# Patient Record
Sex: MALE | Race: BLACK OR AFRICAN AMERICAN | URBAN - METROPOLITAN AREA
[De-identification: names, ages, dates, MRNs, and addresses within clinical notes are randomized per-mention and may not be internally consistent; named-entity substitution may affect disease eponyms.]

---

## 2023-10-31 ENCOUNTER — LAB OUTSIDE AN ENCOUNTER (OUTPATIENT)
Dept: URBAN - METROPOLITAN AREA CLINIC 74 | Facility: CLINIC | Age: 53
End: 2023-10-31

## 2023-10-31 ENCOUNTER — OFFICE VISIT (OUTPATIENT)
Dept: URBAN - METROPOLITAN AREA CLINIC 74 | Facility: CLINIC | Age: 53
End: 2023-10-31
Payer: COMMERCIAL

## 2023-10-31 VITALS
WEIGHT: 203.4 LBS | SYSTOLIC BLOOD PRESSURE: 168 MMHG | HEIGHT: 73 IN | TEMPERATURE: 97.2 F | HEART RATE: 92 BPM | DIASTOLIC BLOOD PRESSURE: 110 MMHG | BODY MASS INDEX: 26.96 KG/M2

## 2023-10-31 DIAGNOSIS — F10.10 ETOH ABUSE: ICD-10-CM

## 2023-10-31 DIAGNOSIS — R74.01 TRANSAMINITIS: ICD-10-CM

## 2023-10-31 PROBLEM — 15167005: Status: ACTIVE | Noted: 2023-10-31

## 2023-10-31 PROCEDURE — 99203 OFFICE O/P NEW LOW 30 MIN: CPT | Performed by: PHYSICIAN ASSISTANT

## 2023-10-31 RX ORDER — ALBUTEROL SULFATE 90 UG/1
AEROSOL, METERED RESPIRATORY (INHALATION)
Qty: 8 UNSPECIFIED | Status: ACTIVE | COMMUNITY

## 2023-10-31 RX ORDER — AZITHROMYCIN DIHYDRATE 250 MG/1
TABLET ORAL
Qty: 6 TABLET | Status: ACTIVE | COMMUNITY

## 2023-10-31 NOTE — HPI-TODAY'S VISIT:
The patient is 53-year-old male with past medical history as noted below is presenting to our clinic today with elevated liver enzymes. He admitted that he drinks ETOH  and smokes Tobacco daily. No GI issues today. Last Colonoscopy in 2019.    -- The patient denies dyspepsia, dysphagia, odynophagia, hemoptysis, hematemesis, nausea, vomiting, regurgitation, melena, constipation, diarrhea, abdominal pain, hematochezia, fever, chills, chest pain, SOB, or any other GI complaints today.  -- The patient admits to ETOH 2-3 beers and 2 shots of Whiskey daily. He smokes Tobacco daily.  He denies Illicit drug use.  -- The patient is up to date with Flu and COVID vaccine.  -- Denies NSAID's.   Procedure: -- Colonoscopy on 12/05/2019 by Dr. Muñoz unremarkable colonic mucosa.  No specimen collected.  Repeat colonoscopy for surveillance in 10 years.

## 2023-11-13 LAB
% SATURATION: 32
A/G RATIO: 1.4
ABSOLUTE BASOPHILS: 48
ABSOLUTE EOSINOPHILS: 174
ABSOLUTE LYMPHOCYTES: 1806
ABSOLUTE MONOCYTES: 888
ABSOLUTE NEUTROPHILS: 3084
ACTIN (SMOOTH MUSCLE) ANTIBODY (IGG): 24
ALBUMIN: 4.3
ALKALINE PHOSPHATASE: 50
ALKALINE PHOSPHATASE: 50
ALPHA-1-ANTITRYPSIN QN: 97
ALT (SGPT): 56
AMYLASE: 57
ANA SCREEN, IFA: POSITIVE
AST (SGOT): 61
BASOPHILS: 0.8
BILIRUBIN, TOTAL: 2.2
BONE ISOENZYMES: 48
BUN/CREATININE RATIO: (no result)
BUN: 20
CALCIUM: 9.7
CARBON DIOXIDE, TOTAL: 24
CERULOPLASMIN: 25
CHLORIDE: 105
CREATININE: 1.16
EGFR: 74
EOSINOPHILS: 2.9
FERRITIN: 273
GGT: 170
GLOBULIN, TOTAL: 3.1
GLUCOSE: 88
HBSAG SCREEN: (no result)
HEMATOCRIT: 44
HEMOGLOBIN: 15.3
HEP A AB, IGM: (no result)
HEP B CORE AB, IGM: (no result)
HEPATITIS C ANTIBODY: (no result)
HEREDITARY HEMOCHROMATOSIS DNA MUT: (no result)
INTERPRETATION: (no result)
INTESTINAL ISOENZYMES: 15
IRON BINDING CAPACITY: 325
IRON, TOTAL: 104
LIPASE: 85
LIVER ISOENZYMES: 37
LYMPHOCYTES: 30.1
MACROHEPATIC ISOENZYMES: 0
MCH: 33.3
MCHC: 34.8
MCV: 95.7
MONOCYTES: 14.8
MPV: 11.1
NEUTROPHILS: 51.4
PLACENTAL ISOENZYMES: 0
PLATELET COUNT: 259
POTASSIUM: 4.6
PROTEIN, TOTAL: 7.4
RDW: 11.4
RED BLOOD CELL COUNT: 4.6
RHEUMATOID FACTOR: <14
SJOGREN'S ANTIBODY (SS-A): (no result)
SJOGREN'S ANTIBODY (SS-B): (no result)
SODIUM: 141
URIC ACID: 6.3
WHITE BLOOD CELL COUNT: 6

## 2023-11-29 ENCOUNTER — OFFICE VISIT (OUTPATIENT)
Dept: URBAN - METROPOLITAN AREA CLINIC 74 | Facility: CLINIC | Age: 53
End: 2023-11-29
Payer: COMMERCIAL

## 2023-11-29 VITALS
DIASTOLIC BLOOD PRESSURE: 110 MMHG | SYSTOLIC BLOOD PRESSURE: 172 MMHG | HEART RATE: 90 BPM | BODY MASS INDEX: 27.91 KG/M2 | HEIGHT: 73 IN | TEMPERATURE: 96.8 F | WEIGHT: 210.6 LBS

## 2023-11-29 DIAGNOSIS — R76.8 POSITIVE ANA (ANTINUCLEAR ANTIBODY): ICD-10-CM

## 2023-11-29 DIAGNOSIS — R74.01 TRANSAMINITIS: ICD-10-CM

## 2023-11-29 DIAGNOSIS — F10.10 ETOH ABUSE: ICD-10-CM

## 2023-11-29 PROCEDURE — 99213 OFFICE O/P EST LOW 20 MIN: CPT | Performed by: PHYSICIAN ASSISTANT

## 2023-11-29 RX ORDER — ALBUTEROL SULFATE 90 UG/1
AEROSOL, METERED RESPIRATORY (INHALATION)
Qty: 8 UNSPECIFIED | Status: ACTIVE | COMMUNITY

## 2023-11-29 RX ORDER — AZITHROMYCIN DIHYDRATE 250 MG/1
TABLET ORAL
Qty: 6 TABLET | Status: ACTIVE | COMMUNITY

## 2023-11-29 NOTE — HPI-TODAY'S VISIT:
The patient is 53-year-old male with past medical history as noted below is presenting to our clinic today to discuss his recen labs and US results. He is cutting down on his ETOH intake. He drinks about 1-2  shots of Whiskey to 3-4 times weekly and he continues to work on that to get it down even more to only social drinking.     -- The patient denies dyspepsia, dysphagia, odynophagia, hemoptysis, hematemesis, nausea, vomiting, regurgitation, melena, constipation, diarrhea, abdominal pain, hematochezia, fever, chills, chest pain, SOB, or any other GI complaints today.  -- The patient admits to ETOH 2-3 beers and 2 shots of Whiskey daily. He smokes Tobacco daily.  He denies Illicit drug use.  -- The patient is up to date with Flu and COVID vaccine.  -- Denies NSAID's.   Diagnostic studies: -- RUQ US on 11/07/2023 with enlarged fatty liver. 0.3 cm nonobstructing stone in the upper pole the right kidney.  -- Labs on 10/31/2023 Uric acid 6.3, Amylase 57, Lipase 85, Ceruloplasmin 25, CMP with ALP 50, AST 61, ALT 56, TB 2.2, and , Alpha 1 Antitrypsin 97, ASMA 24, AMA  negative, REGINALD positive at 1:40, Fe 10, TIBC 325, %Sat. 32, and Ferritin 273. CBC with Hgb 15.3, Hct 44.4, and . HHMA negative. Acute Hepatitis Panel negative.   Procedure: -- Colonoscopy on 12/05/2019 by Dr. Muñoz unremarkable colonic mucosa.  No specimen collected.  Repeat colonoscopy for surveillance in 10 years.

## 2024-02-13 ENCOUNTER — OV EP (OUTPATIENT)
Dept: URBAN - METROPOLITAN AREA CLINIC 74 | Facility: CLINIC | Age: 54
End: 2024-02-13

## 2024-02-13 ENCOUNTER — LAB (OUTPATIENT)
Dept: URBAN - METROPOLITAN AREA CLINIC 74 | Facility: CLINIC | Age: 54
End: 2024-02-13

## 2024-02-13 ENCOUNTER — OV EP (OUTPATIENT)
Dept: URBAN - METROPOLITAN AREA CLINIC 74 | Facility: CLINIC | Age: 54
End: 2024-02-13
Payer: COMMERCIAL

## 2024-02-13 VITALS
HEIGHT: 73 IN | BODY MASS INDEX: 27.49 KG/M2 | HEART RATE: 84 BPM | WEIGHT: 207.4 LBS | TEMPERATURE: 97.5 F | DIASTOLIC BLOOD PRESSURE: 88 MMHG | SYSTOLIC BLOOD PRESSURE: 164 MMHG

## 2024-02-13 DIAGNOSIS — F10.90 ALCOHOL USE DISORDER: ICD-10-CM

## 2024-02-13 DIAGNOSIS — R76.8 POSITIVE ANA (ANTINUCLEAR ANTIBODY): ICD-10-CM

## 2024-02-13 DIAGNOSIS — R74.01 TRANSAMINITIS: ICD-10-CM

## 2024-02-13 PROCEDURE — 99213 OFFICE O/P EST LOW 20 MIN: CPT | Performed by: PHYSICIAN ASSISTANT

## 2024-02-13 RX ORDER — BACLOFEN 5 MG/1
1 TABLET TABLET ORAL EVERY 8 HOURS
Qty: 90 TABLET | Refills: 1 | OUTPATIENT
Start: 2024-02-13 | End: 2024-04-13

## 2024-02-13 RX ORDER — ALBUTEROL SULFATE 90 UG/1
AEROSOL, METERED RESPIRATORY (INHALATION)
Qty: 8 UNSPECIFIED | Status: ACTIVE | COMMUNITY

## 2024-02-13 RX ORDER — AZITHROMYCIN DIHYDRATE 250 MG/1
TABLET ORAL
Qty: 6 TABLET | Status: ON HOLD | COMMUNITY

## 2024-02-13 NOTE — HPI-TODAY'S VISIT:
The patient is 53-year-old male with past medical history as noted below is presenting to our clinic today to discuss ETOH consumption and withdrawal symtoms. He continues to drink the same, He has not seen his PCP to start on medication for ETOH withdrawal. No other GI disorder.     -- The patient denies dyspepsia, dysphagia, odynophagia, hemoptysis, hematemesis, nausea, vomiting, regurgitation, melena, constipation, diarrhea, abdominal pain, hematochezia, fever, chills, chest pain, SOB, or any other GI complaints today.  -- The patient admits to ETOH 2-3 beers and 2 shots of Whiskey daily. He smokes Tobacco daily.  He denies Illicit drug use.  -- The patient is up to date with Flu and COVID vaccine.  -- Denies NSAID's.   Diagnostic studies: -- RUQ US on 11/07/2023 with enlarged fatty liver. 0.3 cm nonobstructing stone in the upper pole the right kidney.  -- Labs on 10/31/2023 Uric acid 6.3, Amylase 57, Lipase 85, Ceruloplasmin 25, CMP with ALP 50, AST 61, ALT 56, TB 2.2, and , Alpha 1 Antitrypsin 97, ASMA 24, AMA  negative, REGINALD positive at 1:40, Fe 10, TIBC 325, %Sat. 32, and Ferritin 273. CBC with Hgb 15.3, Hct 44.4, and . HHMA negative. Acute Hepatitis Panel negative.   Procedure: -- Colonoscopy on 12/05/2019 by Dr. Muñoz unremarkable colonic mucosa.  No specimen collected.  Repeat colonoscopy for surveillance in 10 years.

## 2024-02-19 PROBLEM — 165346000: Status: ACTIVE | Noted: 2024-02-19

## 2024-02-19 LAB
A/G RATIO: 1.5
ABSOLUTE BASOPHILS: 29
ABSOLUTE EOSINOPHILS: 211
ABSOLUTE LYMPHOCYTES: 1870
ABSOLUTE MONOCYTES: 764
ABSOLUTE NEUTROPHILS: 2827
ACTIN (SMOOTH MUSCLE) ANTIBODY (IGG): 26
ALBUMIN: 4.5
ALKALINE PHOSPHATASE: 49
ALT (SGPT): 35
ANA SCREEN, IFA: POSITIVE
AST (SGOT): 34
BASOPHILS: 0.5
BILIRUBIN, DIRECT: 0.7
BILIRUBIN, INDIRECT: 3.2
BILIRUBIN, TOTAL: 3.9
BILIRUBIN, TOTAL: 3.9
BUN/CREATININE RATIO: (no result)
BUN: 11
CALCIUM: 9.8
CARBON DIOXIDE, TOTAL: 25
CHLORIDE: 104
CREATININE: 1.18
EGFR: 73
EOSINOPHILS: 3.7
GLOBULIN, TOTAL: 3
GLUCOSE: 90
HEMATOCRIT: 46.3
HEMOGLOBIN: 16.3
INTERPRETATION: (no result)
LYMPHOCYTES: 32.8
MCH: 33.7
MCHC: 35.2
MCV: 95.9
MITOCHONDRIAL (M2) ANTIBODY: <=20
MONOCYTES: 13.4
MPV: 11.4
NEUTROPHILS: 49.6
PLATELET COUNT: 248
POTASSIUM: 4.5
PROTEIN, TOTAL: 7.5
RDW: 11.6
RED BLOOD CELL COUNT: 4.83
RHEUMATOID FACTOR: <14
SJOGREN'S ANTIBODY (SS-A): (no result)
SJOGREN'S ANTIBODY (SS-B): (no result)
SODIUM: 137
WHITE BLOOD CELL COUNT: 5.7

## 2024-03-06 PROBLEM — 85057007: Status: ACTIVE | Noted: 2024-03-06

## 2024-03-06 PROBLEM — 266474003: Status: ACTIVE | Noted: 2024-03-06

## 2024-03-12 ENCOUNTER — OV EP (OUTPATIENT)
Dept: URBAN - METROPOLITAN AREA CLINIC 74 | Facility: CLINIC | Age: 54
End: 2024-03-12
Payer: COMMERCIAL

## 2024-03-12 VITALS
DIASTOLIC BLOOD PRESSURE: 110 MMHG | WEIGHT: 205.8 LBS | HEART RATE: 94 BPM | TEMPERATURE: 97.2 F | BODY MASS INDEX: 27.28 KG/M2 | SYSTOLIC BLOOD PRESSURE: 160 MMHG | HEIGHT: 73 IN

## 2024-03-12 DIAGNOSIS — R89.9 ABNORMAL LABORATORY TEST: ICD-10-CM

## 2024-03-12 DIAGNOSIS — R74.01 TRANSAMINITIS: ICD-10-CM

## 2024-03-12 DIAGNOSIS — E80.6 HYPERBILIRUBINEMIA: ICD-10-CM

## 2024-03-12 DIAGNOSIS — R76.8 POSITIVE ANA (ANTINUCLEAR ANTIBODY): ICD-10-CM

## 2024-03-12 DIAGNOSIS — F10.90 ALCOHOL USE DISORDER: ICD-10-CM

## 2024-03-12 DIAGNOSIS — K80.20 ASYMPTOMATIC CHOLELITHIASIS: ICD-10-CM

## 2024-03-12 DIAGNOSIS — K76.89 LIVER CYST: ICD-10-CM

## 2024-03-12 DIAGNOSIS — N28.1 RENAL CYST, RIGHT: ICD-10-CM

## 2024-03-12 PROBLEM — 14783006: Status: ACTIVE | Noted: 2024-03-12

## 2024-03-12 PROCEDURE — 99213 OFFICE O/P EST LOW 20 MIN: CPT | Performed by: PHYSICIAN ASSISTANT

## 2024-03-12 RX ORDER — AZITHROMYCIN DIHYDRATE 250 MG/1
TABLET ORAL
Qty: 6 TABLET | Status: ON HOLD | COMMUNITY

## 2024-03-12 RX ORDER — BACLOFEN 5 MG/1
1 TABLET TABLET ORAL EVERY 8 HOURS
Qty: 90 TABLET | Refills: 1 | Status: ACTIVE | COMMUNITY
Start: 2024-02-13 | End: 2024-04-13

## 2024-03-12 RX ORDER — ALBUTEROL SULFATE 90 UG/1
AEROSOL, METERED RESPIRATORY (INHALATION)
Qty: 8 UNSPECIFIED | Status: ACTIVE | COMMUNITY

## 2024-03-12 NOTE — HPI-TODAY'S VISIT:
The patient is 53-year-old male with past medical history as noted below is presenting to our clinic today to discuss labs and CT results. He was started on Bacofen 5 mg every 8 hours last visit to help with ETOH withdrawal. He is doing much better on Baclofen 5 mg two tablets twice daily. However, it is very expensive. But he is not drinking ETOH everyday anymore.     -- The patient denies dyspepsia, dysphagia, odynophagia, hemoptysis, hematemesis, nausea, vomiting, regurgitation, melena, constipation, diarrhea, abdominal pain, hematochezia, fever, chills, chest pain, SOB, or any other GI complaints today.  -- The patient admits to ETOH 2-3 beers and 2 shots of Whiskey daily. He smokes Tobacco daily.  He denies Illicit drug use.  -- The patient is up to date with Flu and COVID vaccine.  -- Denies NSAID's.   Diagnostic studies: -- CT of abdomen and pelvis on 03/01/2024 with Cholelithiasis without evidence for cholecystitis.  Mild free fluid in the pelvis, but no adjacent inflammatory process. Fatty infiltration of the liver.  Subcentimeter hypodensities on the right kidney and liver which are too small to characterize, but are most likely cysts.   -- Labs on 02/13/2024 ASMA 26, AMA 20, REGINALD positive at 1:80. CMP with TB 3.9, ALP, AST, and ALT are normal. CBC with normal Hgb, Hct, and PLT.   -- RUQ US on 11/07/2023 with enlarged fatty liver. 0.3 cm nonobstructing stone in the upper pole the right kidney.  -- Labs on 10/31/2023 Uric acid 6.3, Amylase 57, Lipase 85, Ceruloplasmin 25, CMP with ALP 50, AST 61, ALT 56, TB 2.2, and , Alpha 1 Antitrypsin 97, ASMA 24, AMA  negative, REGINALD positive at 1:40, Fe 10, TIBC 325, %Sat. 32, and Ferritin 273. CBC with Hgb 15.3, Hct 44.4, and . HHMA negative. Acute Hepatitis Panel negative.   Procedure: -- Colonoscopy on 12/05/2019 by Dr. Muñoz unremarkable colonic mucosa.  No specimen collected.  Repeat colonoscopy for surveillance in 10 years.

## 2024-04-30 ENCOUNTER — OV EP (OUTPATIENT)
Dept: URBAN - METROPOLITAN AREA CLINIC 74 | Facility: CLINIC | Age: 54
End: 2024-04-30

## 2024-04-30 RX ORDER — ALBUTEROL SULFATE 90 UG/1
AEROSOL, METERED RESPIRATORY (INHALATION)
Qty: 8 UNSPECIFIED | Status: ACTIVE | COMMUNITY

## 2024-04-30 RX ORDER — AZITHROMYCIN DIHYDRATE 250 MG/1
TABLET ORAL
Qty: 6 TABLET | Status: ON HOLD | COMMUNITY

## 2024-05-06 ENCOUNTER — OFFICE VISIT (OUTPATIENT)
Dept: URBAN - METROPOLITAN AREA CLINIC 74 | Facility: CLINIC | Age: 54
End: 2024-05-06

## 2024-05-06 RX ORDER — ALBUTEROL SULFATE 90 UG/1
AEROSOL, METERED RESPIRATORY (INHALATION)
Qty: 8 UNSPECIFIED | Status: ACTIVE | COMMUNITY

## 2024-05-06 RX ORDER — AZITHROMYCIN DIHYDRATE 250 MG/1
TABLET ORAL
Qty: 6 TABLET | Status: ON HOLD | COMMUNITY

## 2024-05-07 ENCOUNTER — DASHBOARD ENCOUNTERS (OUTPATIENT)
Age: 54
End: 2024-05-07

## 2024-05-13 ENCOUNTER — OFFICE VISIT (OUTPATIENT)
Dept: URBAN - METROPOLITAN AREA CLINIC 74 | Facility: CLINIC | Age: 54
End: 2024-05-13

## 2024-05-13 RX ORDER — ALBUTEROL SULFATE 90 UG/1
AEROSOL, METERED RESPIRATORY (INHALATION)
Qty: 8 UNSPECIFIED | Status: ACTIVE | COMMUNITY

## 2024-05-13 RX ORDER — AZITHROMYCIN DIHYDRATE 250 MG/1
TABLET ORAL
Qty: 6 TABLET | Status: ON HOLD | COMMUNITY